# Patient Record
Sex: FEMALE | Race: WHITE | NOT HISPANIC OR LATINO | Employment: PART TIME | URBAN - METROPOLITAN AREA
[De-identification: names, ages, dates, MRNs, and addresses within clinical notes are randomized per-mention and may not be internally consistent; named-entity substitution may affect disease eponyms.]

---

## 2024-01-31 ENCOUNTER — TRANSCRIBE ORDERS (OUTPATIENT)
Dept: ADMINISTRATIVE | Facility: HOSPITAL | Age: 69
End: 2024-01-31
Payer: COMMERCIAL

## 2024-01-31 DIAGNOSIS — Z13.6 ENCOUNTER FOR SCREENING FOR VASCULAR DISEASE: Primary | ICD-10-CM

## 2024-02-12 ENCOUNTER — TRANSCRIBE ORDERS (OUTPATIENT)
Dept: ADMINISTRATIVE | Facility: HOSPITAL | Age: 69
End: 2024-02-12
Payer: COMMERCIAL

## 2024-02-12 DIAGNOSIS — I70.90 ATHEROSCLEROTIC VASCULAR DISEASE: Primary | ICD-10-CM

## 2024-02-22 ENCOUNTER — HOSPITAL ENCOUNTER (OUTPATIENT)
Dept: CT IMAGING | Facility: HOSPITAL | Age: 69
Discharge: HOME | End: 2024-02-22

## 2024-02-22 ENCOUNTER — HOSPITAL ENCOUNTER (OUTPATIENT)
Dept: ULTRASOUND IMAGING | Facility: HOSPITAL | Age: 69
Discharge: HOME | End: 2024-02-22

## 2024-02-22 DIAGNOSIS — Z13.6 ENCOUNTER FOR SCREENING FOR VASCULAR DISEASE: ICD-10-CM

## 2024-02-22 DIAGNOSIS — I70.90 ATHEROSCLEROTIC VASCULAR DISEASE: ICD-10-CM

## 2024-02-22 PROCEDURE — 93799 UNLISTED CV SVC/PROCEDURE: CPT

## 2024-02-22 PROCEDURE — 75571 CT HRT W/O DYE W/CA TEST: CPT

## 2024-02-23 LAB
BH CV VAS SCREENING CAROTID CCA LEFT: 50.3 CM/SEC
BH CV VAS SCREENING CAROTID CCA RIGHT: 48.6 CM/SEC
BH CV VAS SCREENING CAROTID ICA LEFT: 38.4 CM/SEC
BH CV VAS SCREENING CAROTID ICA RIGHT: 49.2 CM/SEC
BH CV XLRA MEAS - MID AO DIAM: 2 CM
BH CV XLRA MEAS - PAD LEFT ABI PT: 1
BH CV XLRA MEAS - PAD LEFT ARM: 150 MMHG
BH CV XLRA MEAS - PAD LEFT LEG PT: 162 MMHG
BH CV XLRA MEAS - PAD RIGHT ABI PT: 1.1
BH CV XLRA MEAS - PAD RIGHT ARM: 149 MMHG
BH CV XLRA MEAS - PAD RIGHT LEG PT: 162 MMHG
BH CV XLRA MEAS LEFT ICA/CCA RATIO: 0.8
BH CV XLRA MEAS RIGHT ICA/CCA RATIO: 1

## 2024-03-11 ENCOUNTER — OFFICE VISIT (OUTPATIENT)
Dept: ENDOCRINOLOGY | Facility: CLINIC | Age: 69
End: 2024-03-11
Payer: MEDICARE

## 2024-03-11 ENCOUNTER — LAB (OUTPATIENT)
Dept: LAB | Facility: HOSPITAL | Age: 69
End: 2024-03-11
Payer: MEDICARE

## 2024-03-11 VITALS
OXYGEN SATURATION: 99 % | WEIGHT: 123 LBS | SYSTOLIC BLOOD PRESSURE: 134 MMHG | HEART RATE: 56 BPM | HEIGHT: 66 IN | DIASTOLIC BLOOD PRESSURE: 72 MMHG | BODY MASS INDEX: 19.77 KG/M2

## 2024-03-11 DIAGNOSIS — E04.2 NONTOXIC MULTINODULAR GOITER: Primary | ICD-10-CM

## 2024-03-11 DIAGNOSIS — R13.10 DYSPHAGIA, UNSPECIFIED TYPE: ICD-10-CM

## 2024-03-11 DIAGNOSIS — R94.6 ABNORMAL THYROID FUNCTION TEST: ICD-10-CM

## 2024-03-11 DIAGNOSIS — R76.8 ANTI-TPO ANTIBODIES PRESENT: ICD-10-CM

## 2024-03-11 LAB
T3FREE SERPL-MCNC: 2.95 PG/ML (ref 2–4.4)
T4 FREE SERPL-MCNC: 1.08 NG/DL (ref 0.93–1.7)
TSH SERPL DL<=0.05 MIU/L-ACNC: 4.76 UIU/ML (ref 0.27–4.2)

## 2024-03-11 PROCEDURE — 36415 COLL VENOUS BLD VENIPUNCTURE: CPT

## 2024-03-11 PROCEDURE — 1160F RVW MEDS BY RX/DR IN RCRD: CPT | Performed by: INTERNAL MEDICINE

## 2024-03-11 PROCEDURE — 84443 ASSAY THYROID STIM HORMONE: CPT

## 2024-03-11 PROCEDURE — 84481 FREE ASSAY (FT-3): CPT

## 2024-03-11 PROCEDURE — 99204 OFFICE O/P NEW MOD 45 MIN: CPT | Performed by: INTERNAL MEDICINE

## 2024-03-11 PROCEDURE — 84439 ASSAY OF FREE THYROXINE: CPT

## 2024-03-11 PROCEDURE — 1159F MED LIST DOCD IN RCRD: CPT | Performed by: INTERNAL MEDICINE

## 2024-03-11 RX ORDER — PRAVASTATIN SODIUM 40 MG
40 TABLET ORAL DAILY
COMMUNITY

## 2024-03-11 NOTE — PATIENT INSTRUCTIONS
Please,    - Get blood work done today.  - Plan for FNA biopsy of thyroid nodule.  - Plan for swallow evaluation.    Follow-up in clinic back again in 3 months time.    Thank you for your visit today.    If you have any questions or concerns please feel free to reach out of the office.

## 2024-03-11 NOTE — PROGRESS NOTES
-----------------------------------------------------------------  ENDOCRINE CLINIC NOTE  -----------------------------------------------------------------        PATIENT NAME: Luly Perez  PATIENT : 1955 AGE: 68 y.o.  MRN NUMBER: [id number]  PRIMARY CARE: Dami Davila MD    ==========================================================================    CHIEF COMPLAINT: Elevated TPO levels and thyroid nodule  DATE OF SERVICE: 24    ==========================================================================    HPI / SUBJECTIVE    68 y.o. female is seen in the clinic today for thyroid nodule.  Patient in  had a CT chest low-dose for lung cancer screening given history of smoking and was found to have incidental thyroid nodule.  After the finding patient had ultrasound thyroid dedicated on 2/15/2023 which showed bilateral thyroid nodule:  Isthmus 0.25 cm  Right lobe 4.3 x 1.6 x 1.3 cm but there is lobulation of the right lobe with suggested calcification in the lower lobe seen with thyroiditis.  Right lobe nodule, exophytic, 2.6 x 2.8 x 2.4 cm  Left lobe 5.2 x 1.8 x 1.1 cm, small left inferior nodule 1.2 x 1.2 x 1.2 cm, solid and cystic  Patient was seen by ENT in 2024 and lab work was ordered which showed elevated TPO numbers and patient was referred to endocrinology clinic.    - Neck swelling or asymmetrical neck: -ve  - Difficulty swallowing/ dysphagia/ odynophagia, choking sensation: Dysphagia to both solids and liquid, since about one year time  - Dysphonia / Hoarseness: no voice change  - Neck pain / tenderness: -ve  - SOB: -ve  - Exposure to radiation to the head and neck: -ve  - Previous thyroid surgery: Hx of Goiter and surgery in     - Tremors:  -ve  - Palpitations:  very infrequent  - Fatigue:  denied  - Heat /cold Intolerance:  Cold all the the time  - Skin changes: -ve  - Hair changes:  -ve    - Family hx of thyroid problems:   -ve    ==========================================================================                                                PAST MEDICAL HISTORY    Past Medical History:   Diagnosis Date    Cataract     Hyperlipidemia        ==========================================================================    PAST SURGICAL HISTORY    Past Surgical History:   Procedure Laterality Date    THYROIDECTOMY  1990    partial thyroidectomy due to goiter @ ENT       ==========================================================================    FAMILY HISTORY    Family History   Problem Relation Age of Onset    Cancer Maternal Grandmother         breast    Cancer Maternal Grandfather         stomach       ==========================================================================    SOCIAL HISTORY    Social History     Socioeconomic History    Marital status:    Tobacco Use    Smoking status: Never    Smokeless tobacco: Never   Vaping Use    Vaping status: Some Days    Substances: Nicotine, Flavoring    Devices: Pre-filled or refillable cartridge   Substance and Sexual Activity    Alcohol use: Never    Drug use: Never    Sexual activity: Defer       ==========================================================================    MEDICATIONS      Current Outpatient Medications:     pravastatin (PRAVACHOL) 40 MG tablet, Take 1 tablet by mouth Daily., Disp: , Rfl:     ==========================================================================    ALLERGIES    Allergies   Allergen Reactions    Levofloxacin Diarrhea    Sulfa Antibiotics Hives       ==========================================================================    OBJECTIVE    Vitals:    03/11/24 0929   BP: 134/72   Pulse: 56   SpO2: 99%     Body mass index is 19.85 kg/m².     General: Alert, cooperative, no acute distress  Thyroid:  no enlargement/tenderness/palpable nodules  Lungs: Clear to auscultation bilaterally, respirations unlabored  Heart: Regular rate and rhythm,  "S1 and S2 normal, no murmur, rub or gallop  Abdomen: Soft, NT, ND and Bowel sounds Positive  Extremities:  Extremities normal, atraumatic, no cyanosis or edema    ==========================================================================    LAB EVALUATION                                ==========================================================================    ASSESSMENT AND PLAN    # Multinodular nontoxic goiter  - Reviewed ultrasound report from [facility]  - The right-sided nodule is big enough that it will justify FNA biopsy  - FNA ordered and will follow results and further treatment accordingly  - Discussed with patient in detail and counseled about possible outcomes to which she verbalized understanding    # Elevated TPO levels, TSH, free T4 and free T3 ordered  - Will review results and plan treatment if needed accordingly    # Dysphagia, swallow evaluation ordered    Detailed counseling about thyroid gland, thyroid function and thyroid nodules performed during this visit.    Thank you for courtesy of consultation.    Return to clinic: 3 months    Entire assessment and plan was discussed and counseled the patient in detail to which patient verbalized understanding and agreed with care.  Answered all queries and concerns.    This note was created using voice recognition software and is inherently subject to errors including those of syntax and \"sound-alike\" substitutions which may escape proofreading.  In such instances, original meaning may be extrapolated by contextual derivation.    Note: Portions of this note may have been copied from previous notes but documentation have been reviewed and edited as necessary to support clinical decision making for today's visit.    ==========================================================================    INFORMATION PROVIDED TO PATIENT    Patient Instructions   Please,    - Get blood work done today.  - Plan for FNA biopsy of thyroid nodule.  - Plan " for swallow evaluation.    Follow-up in clinic back again in 3 months time.    Thank you for your visit today.    If you have any questions or concerns please feel free to reach out of the office.       ==========================================================================  Dl Nash MD  Department of Endocrine, Diabetes and Metabolism  [place], [place]  ==========================================================================

## 2024-03-27 ENCOUNTER — HOSPITAL ENCOUNTER (OUTPATIENT)
Dept: ULTRASOUND IMAGING | Facility: HOSPITAL | Age: 69
Discharge: HOME | End: 2024-03-27
Payer: MEDICARE

## 2024-03-27 ENCOUNTER — APPOINTMENT (OUTPATIENT)
Dept: OTHER | Facility: HOSPITAL | Age: 69
End: 2024-03-27
Payer: MEDICARE

## 2024-03-27 DIAGNOSIS — E04.2 NONTOXIC MULTINODULAR GOITER: ICD-10-CM

## 2024-03-27 PROCEDURE — 25010000002 LIDOCAINE 1 % SOLUTION: Performed by: INTERNAL MEDICINE

## 2024-03-27 PROCEDURE — 88342 IMHCHEM/IMCYTCHM 1ST ANTB: CPT | Performed by: INTERNAL MEDICINE

## 2024-03-27 PROCEDURE — 88305 TISSUE EXAM BY PATHOLOGIST: CPT | Performed by: INTERNAL MEDICINE

## 2024-03-27 PROCEDURE — 88341 IMHCHEM/IMCYTCHM EA ADD ANTB: CPT | Performed by: INTERNAL MEDICINE

## 2024-03-27 PROCEDURE — 88173 CYTOPATH EVAL FNA REPORT: CPT | Performed by: INTERNAL MEDICINE

## 2024-03-27 RX ORDER — LIDOCAINE HYDROCHLORIDE 10 MG/ML
5 INJECTION, SOLUTION INFILTRATION; PERINEURAL ONCE
Status: COMPLETED | OUTPATIENT
Start: 2024-03-27 | End: 2024-03-27

## 2024-03-27 RX ADMIN — Medication 5 ML: at 13:10

## 2024-03-29 LAB
LAB AP CASE REPORT: NORMAL
LAB AP SPECIAL STAINS: NORMAL
PATH REPORT.FINAL DX SPEC: NORMAL
PATH REPORT.GROSS SPEC: NORMAL

## 2024-04-11 ENCOUNTER — TELEPHONE (OUTPATIENT)
Dept: ENDOCRINOLOGY | Facility: CLINIC | Age: 69
End: 2024-04-11

## 2024-04-11 ENCOUNTER — HOSPITAL ENCOUNTER (OUTPATIENT)
Dept: GENERAL RADIOLOGY | Facility: HOSPITAL | Age: 69
Discharge: HOME | End: 2024-04-11
Admitting: INTERNAL MEDICINE
Payer: MEDICARE

## 2024-04-11 DIAGNOSIS — R13.10 DYSPHAGIA, UNSPECIFIED TYPE: ICD-10-CM

## 2024-04-11 PROCEDURE — A9270 NON-COVERED ITEM OR SERVICE: HCPCS | Performed by: INTERNAL MEDICINE

## 2024-04-11 PROCEDURE — 74230 X-RAY XM SWLNG FUNCJ C+: CPT

## 2024-04-11 PROCEDURE — 63710000001 BARIUM SULFATE 40 % SUSPENSION: Performed by: INTERNAL MEDICINE

## 2024-04-11 PROCEDURE — 92611 MOTION FLUOROSCOPY/SWALLOW: CPT

## 2024-04-11 RX ADMIN — BARIUM SULFATE 50 ML: 400 SUSPENSION ORAL at 09:51

## 2024-04-11 NOTE — MBS/VFSS/FEES
"Outpatient Speech Language Pathology   Adult Swallow Initial Evaluation  [facility]     Patient Name: Luly Perez  : 1955  MRN: [id number]  Today's Date: 2024         Visit Date: 2024   There is no problem list on file for this patient.       Past Medical History:   Diagnosis Date    Cataract     Hyperlipidemia         Past Surgical History:   Procedure Laterality Date    THYROIDECTOMY      partial thyroidectomy due to goiter @ ENT         Visit Dx:     ICD-10-CM ICD-9-CM   1. Dysphagia, unspecified type  R13.10 787.20                SLP Adult Swallow Evaluation       Row Name 24 1400       Rehab Evaluation    Document Type evaluation  -LF    Patient Effort excellent  -LF       General Information    Patient Profile Reviewed yes  -LF    Pertinent History Of Current Problem Pt is a 67 y/o female who presents to [facility] to objectively assess swallow function. Pt reports difficutly w/ both solids and liquids. She endorses sensation of PO \"sticking.\" She denies hx of reflux, CVA, or head/neck cancer. She endorses hx of thyroidectomy and PNA. She consumes a regular and thin liquid diet at baseline.   -LF    Current Method of Nutrition regular textures;thin liquids  -LF    Precautions/Limitations, Vision WFL;for purposes of eval  -LF    Precautions/Limitations, Hearing WFL;for purposes of eval  -LF    Prior Level of Function-Swallowing no diet consistency restrictions  -LF    Plans/Goals Discussed with patient;agreed upon  -LF    Barriers to Rehab none identified  -LF       Oral Motor Structure and Function    Dentition Assessment natural, present and adequate  -LF    Secretion Management WNL/WFL  -LF    Mucosal Quality moist, healthy  -LF       Oral Musculature and Cranial Nerve Assessment    Oral Motor General Assessment WFL  -LF       MBS/VFSS    Utensils Used spoon;cup;straw  -LF    Consistencies Trialed regular textures;mixed consistency;pureed;thin liquids  -LF       MBS/VFSS " Interpretation    VFSS Summary Video Fluoroscopic Swallow Study conducted in the lateral projection with the patient standing upright. Pt self fed all trials as provided by SLP respectively: thin liquid by cup x 2, puree (applesauce) x 2, mechanical soft/mixed consistency (peaches) x 2, regular solid (cracker) x 1, thin by straw sequential drinks x1, & esophageal scan.  Bolus formation, cohesion, & transit are WFL for all trials. 2 swallows were elicited for each bolus w/ all trials of thin liquids. Laryngeal elevation, epiglottic deflection, and forward hyolaryngeal movement are WFL. No remarkable cricopharyngeal or UES findings. There is no laryngeal vestibule penetration or aspiration observed at any time. Mild vallecular residue following puree and regular solid trials, which clears following a cued and/or spontaneous dry swallow. Esophageal scan was unremarkable    “When evaluating the esophageal domain, it is important to understand that neither the imaging protocol nor purpose of the MBSS is sufficient for complete assessment of esophageal function; rather, MBSS focuses on the degree and timing of esophageal bolus clearance (in the upright position) both of which influence oral and pharyngeal function” (PASHA Fernnadez., JOEL Archibald., Terry H.S., LUIS ANTONIO Capone., ANGELICA Glass., Naye, M.A. (2020). Best practices in Modified Barium Swallow Studies, American Journal of Speech-Language Pathology, 29(2S),9465-7061. https://doi.org/10.1044/0917_DACCJ-61-99061).     PENETRATION/ASPIRATION SCALE: (LUIS ANTONIO Marino et al. A Penetration-Aspiration Scale. Dysphagia. 1996; 11(2):93-8.)    Rosenbeck's Scale 1 2 3 4 5 6 7 8   Thin by spoon                   Thin by cup  x                 Thin by straw  x                 NTL/mildly thick by spoon                   NTL/mildly thick by cup                   NTL/mildly thick by straw                   HTL/moderately thick by spoon                   HTL/moderately thick by  cup                   puree x                  mechanical soft mixed  x                 mechanical soft no mix                   solid  x                    Rosenbeck’s 8- Point Penetration Aspiration Scale  Material does not enter airway  Material enters the airway, remains above the vocal folds and is ejected from the airway  Material enters the airway, remains above the vocal folds, and is not ejected from the airway  Material enters the airway, contacts the vocal folds and is ejected from the airway  Material enters the airway, contacts the vocal folds, and is not ejected from the airway  Material enters the airway, passes below the vocal folds, and is ejected out of the trachea  Material enters the airway, passes below the vocal folds and is not ejected from the trachea despite effort  Material enters the airway, passes below the vocal folds, and no effort is made to eject      IMPRESSIONS: Pt presents with oropharyngeal swallow which is judged to be within functional limits under fluoroscopy with all trials assessed.      RECOMMENDATIONS:   - continue a regular and thin liquid diet  - avoid dry, tough, and sticky consistencies  - standard safe swallow precautions    Reviewed VFSS results/recs w/ pt immediately following procedure w/ playback of study via [facility]. She verbalized understanding. Thank you for referring this pleasant pt.       -LF       Initiation of Pharyngeal Swallow    Pharyngeal Residue pudding/puree;regular textures  -LF    Response to Residue cleared residue with spontaneous subsequent swallow;cleared residue with cued swallow  -LF       Esophageal Phase    Esophageal Phase no impairments  -LF       Swallowing Quality of Life Assessment    Education and counseling provided Safest diet options;Aspiration precautions;Compensatory strategy recommendations and rationale  -LF       SLP Evaluation Clinical Impression    SLP Swallowing Diagnosis functional oral phase;functional pharyngeal phase  -LF        Recommendations    SLP Diet Recommendation regular textures;thin liquids  -    Recommended Precautions and Strategies upright posture during/after eating;small bites of food and sips of liquid;multiple swallows per bite of food;multiple swallows per sip of liquid;general aspiration precautions  -    Oral Care Recommendations Oral Care BID/PRN;Oral Care before breakfast, after meals and PRN;Toothbrush  -    SLP Rec. for Method of Medication Administration as tolerated  -              User Key  (r) = Recorded By, (t) = Taken By, (c) = Cosigned By      Initials Name Provider Type     Felecia Poole, SLP Speech and Language Pathologist                                               Time Calculation:                     QUINN De Leon  4/11/2024

## 2024-06-11 ENCOUNTER — OFFICE VISIT (OUTPATIENT)
Dept: ENDOCRINOLOGY | Facility: CLINIC | Age: 69
End: 2024-06-11
Payer: MEDICARE

## 2024-06-11 ENCOUNTER — LAB (OUTPATIENT)
Dept: LAB | Facility: HOSPITAL | Age: 69
End: 2024-06-11
Payer: MEDICARE

## 2024-06-11 VITALS
HEIGHT: 66 IN | OXYGEN SATURATION: 99 % | DIASTOLIC BLOOD PRESSURE: 66 MMHG | WEIGHT: 118 LBS | SYSTOLIC BLOOD PRESSURE: 114 MMHG | HEART RATE: 62 BPM | BODY MASS INDEX: 18.96 KG/M2

## 2024-06-11 DIAGNOSIS — E04.2 NONTOXIC MULTINODULAR GOITER: ICD-10-CM

## 2024-06-11 DIAGNOSIS — R76.8 ANTI-TPO ANTIBODIES PRESENT: Primary | ICD-10-CM

## 2024-06-11 DIAGNOSIS — R94.6 ABNORMAL THYROID FUNCTION TEST: ICD-10-CM

## 2024-06-11 LAB
T4 FREE SERPL-MCNC: 1.1 NG/DL (ref 0.93–1.7)
TSH SERPL DL<=0.05 MIU/L-ACNC: 3.14 UIU/ML (ref 0.27–4.2)

## 2024-06-11 PROCEDURE — 84443 ASSAY THYROID STIM HORMONE: CPT

## 2024-06-11 PROCEDURE — 84439 ASSAY OF FREE THYROXINE: CPT

## 2024-06-11 PROCEDURE — 99214 OFFICE O/P EST MOD 30 MIN: CPT | Performed by: INTERNAL MEDICINE

## 2024-06-11 PROCEDURE — 36415 COLL VENOUS BLD VENIPUNCTURE: CPT

## 2024-06-11 NOTE — PROGRESS NOTES
-----------------------------------------------------------------  ENDOCRINE CLINIC NOTE  -----------------------------------------------------------------        PATIENT NAME: Luly Perez  PATIENT : 1955 AGE: 69 y.o.  MRN NUMBER: [id number]  PRIMARY CARE: Dami Davila MD    ==========================================================================    CHIEF COMPLAINT: Elevated TPO levels and thyroid nodule  DATE OF SERVICE: 24    ==========================================================================    HPI / SUBJECTIVE    69 y.o. female is seen in the clinic today for thyroid nodule.  Patient in  had a CT chest low-dose for lung cancer screening given history of smoking and was found to have incidental thyroid nodule.  Patient had dedicated thyroid ultrasound done on 12/15/2023 which showed bilateral thyroid nodules.  On the ultrasound there was evidence of significant nodule on the right thyroid for which patient underwent FNA biopsy on 3/27/2024 and pathology was benign.  Patient also had blood work in 2023 which showed evidence of mildly elevated TSH with normal free T4 and no indication for medical management.  Plan is to repeat blood work today.  Exposure to radiation to the head and neck: -ve  Previous thyroid surgery: Hx of Goiter and surgery in 1990's  Family hx of thyroid problems:  -ve    ==========================================================================                                                PAST MEDICAL HISTORY    Past Medical History:   Diagnosis Date    Cataract     Hyperlipidemia        ==========================================================================    PAST SURGICAL HISTORY    Past Surgical History:   Procedure Laterality Date    THYROIDECTOMY  1990    partial thyroidectomy due to goiter @ ENT       ==========================================================================    FAMILY HISTORY    Family History   Problem Relation  Age of Onset    Cancer Maternal Grandmother         breast    Cancer Maternal Grandfather         stomach       ==========================================================================    SOCIAL HISTORY    Social History     Socioeconomic History    Marital status:    Tobacco Use    Smoking status: Former     Types: Cigarettes    Smokeless tobacco: Never   Vaping Use    Vaping status: Some Days    Substances: Nicotine, Flavoring    Devices: Pre-filled or refillable cartridge   Substance and Sexual Activity    Alcohol use: Never    Drug use: Never    Sexual activity: Defer       ==========================================================================    MEDICATIONS      Current Outpatient Medications:     pravastatin (PRAVACHOL) 40 MG tablet, Take 1 tablet by mouth Daily., Disp: , Rfl:     ==========================================================================    ALLERGIES    Allergies   Allergen Reactions    Levofloxacin Diarrhea    Sulfa Antibiotics Hives       ==========================================================================    OBJECTIVE    Vitals:    06/11/24 0852   BP: 114/66   Pulse: 62   SpO2: 99%     Body mass index is 19.05 kg/m².     General: Alert, cooperative, no acute distress  Thyroid:  no enlargement/tenderness/palpable nodules    ==========================================================================    LAB EVALUATION    Lab Results   Component Value Date    TSH 4.760 (H) 03/11/2024    FREET4 1.08 03/11/2024 1/4/2024  TSH 4.04  Free T33.40      ==========================================================================    THYROID US:    12/15/2023    Isthmus 0.25 cm  Right lobe 4.3 x 1.6 x 1.3 cm but there is lobulation of the right lobe with suggested calcification in the lower lobe seen with thyroiditis.  Right lobe nodule, exophytic, 2.6 x 2.8 x 2.4 cm  Left lobe 5.2 x 1.8 x 1.1 cm, small left inferior nodule 1.2 x 1.2 x 1.2 cm, solid and  "cystic    ==========================================================================    ASSESSMENT AND PLAN    # Multinodular nontoxic goiter  # Elevated TPO levels with mildly elevated TSH and normal free T3 and T4  - Patient is due for repeat blood work today  - Ultrasound guided biopsy showed no evidence of malignancy but there was evidence of inflammation suggesting thyroiditis  - Given elevated TPO levels patient will be considered clinically euthyroid Hashimoto's disease  - There is a risk for patient to have hypothyroidism therefore we will follow thyroid function in 3 months time with clinical follow-up in 6 months time  - Plan for repeat ultrasound back again in December 2024    Return to clinic: 6 months    Entire assessment and plan was discussed and counseled the patient in detail to which patient verbalized understanding and agreed with care.  Answered all queries and concerns.    This note was created using voice recognition software and is inherently subject to errors including those of syntax and \"sound-alike\" substitutions which may escape proofreading.  In such instances, original meaning may be extrapolated by contextual derivation.    Note: Portions of this note may have been copied from previous notes but documentation have been reviewed and edited as necessary to support clinical decision making for today's visit.    ==========================================================================    INFORMATION PROVIDED TO PATIENT    Patient Instructions   Please get blood work done today.  Repeat blood work again in 3 months in 6 months time.  Also plan for repeat ultrasound in December 2024.    Clinical follow-up in 6 months time.  Depending on your blood results we will plan for medical management if needed.    Thank you for your visit today.    If you have any questions or concerns please feel free to reach out of the office. "       ==========================================================================  Dl Nash MD  Department of Endocrine, Diabetes and Metabolism  [facility]  [place], [place]  ==========================================================================

## 2024-06-11 NOTE — PATIENT INSTRUCTIONS
Please get blood work done today.  Repeat blood work again in 3 months in 6 months time.  Also plan for repeat ultrasound in December 2024.    Clinical follow-up in 6 months time.  Depending on your blood results we will plan for medical management if needed.    Thank you for your visit today.    If you have any questions or concerns please feel free to reach out of the office.

## 2024-07-30 ENCOUNTER — TELEPHONE (OUTPATIENT)
Dept: ENDOCRINOLOGY | Facility: CLINIC | Age: 69
End: 2024-07-30
Payer: COMMERCIAL

## 2024-07-30 NOTE — TELEPHONE ENCOUNTER
Caller: Luly Perez    Relationship: Self    Best call back number: [phone]    What orders are you requesting (i.e. lab or imaging): BLOOD WORK    In what timeframe would the patient need to come in:     Where will you receive your lab/imaging services: OUR OFFICE    Additional notes: PT WORK SCHEDULE HAS CHANGED AND SHE CANNOT HAVE APPTS ON TUESDAYS AND FRIDAYS

## 2024-09-04 ENCOUNTER — TELEPHONE (OUTPATIENT)
Dept: ENDOCRINOLOGY | Facility: CLINIC | Age: 69
End: 2024-09-04

## 2024-09-04 NOTE — TELEPHONE ENCOUNTER
Hub staff attempted to follow warm transfer process and was unsuccessful     Caller: WINDY    Relationship to patient: Other    Best call back number: [phone]    FAX: [phone]    Patient is needing:PT IS THERE NOW TO GET ULTRASOUND DONE BUT IN NEED OF ORDER FOR PT TO HAVE ULTRASOUND OF THE SOFT TISSUE THYROID.    [facility] IN [place]

## 2024-09-16 ENCOUNTER — LAB (OUTPATIENT)
Dept: LAB | Facility: HOSPITAL | Age: 69
End: 2024-09-16
Payer: MEDICARE

## 2024-09-16 DIAGNOSIS — R76.8 ANTI-TPO ANTIBODIES PRESENT: ICD-10-CM

## 2024-09-16 DIAGNOSIS — R94.6 ABNORMAL THYROID FUNCTION TEST: ICD-10-CM

## 2024-09-16 LAB
T4 FREE SERPL-MCNC: 0.96 NG/DL (ref 0.93–1.7)
TSH SERPL DL<=0.05 MIU/L-ACNC: 4.69 UIU/ML (ref 0.27–4.2)

## 2024-09-16 PROCEDURE — 84439 ASSAY OF FREE THYROXINE: CPT

## 2024-09-16 PROCEDURE — 36415 COLL VENOUS BLD VENIPUNCTURE: CPT

## 2024-09-16 PROCEDURE — 84443 ASSAY THYROID STIM HORMONE: CPT

## 2025-08-14 ENCOUNTER — RESULTS FOLLOW-UP (OUTPATIENT)
Dept: ENDOCRINOLOGY | Facility: CLINIC | Age: 70
End: 2025-08-14
Payer: COMMERCIAL